# Patient Record
Sex: MALE | Race: WHITE | Employment: FULL TIME | URBAN - METROPOLITAN AREA
[De-identification: names, ages, dates, MRNs, and addresses within clinical notes are randomized per-mention and may not be internally consistent; named-entity substitution may affect disease eponyms.]

---

## 2021-06-03 ENCOUNTER — RECORDS - HEALTHEAST (OUTPATIENT)
Dept: LAB | Facility: CLINIC | Age: 26
End: 2021-06-03

## 2021-06-03 LAB
SARS-COV-2 PCR COMMENT: NORMAL
SARS-COV-2 RNA SPEC QL NAA+PROBE: NEGATIVE
SARS-COV-2 VIRUS SPECIMEN SOURCE: NORMAL

## 2021-12-23 ENCOUNTER — HOSPITAL ENCOUNTER (EMERGENCY)
Facility: CLINIC | Age: 26
Discharge: HOME | End: 2021-12-23
Attending: EMERGENCY MEDICINE | Admitting: EMERGENCY MEDICINE
Payer: COMMERCIAL

## 2021-12-23 VITALS
WEIGHT: 165 LBS | OXYGEN SATURATION: 96 % | DIASTOLIC BLOOD PRESSURE: 121 MMHG | BODY MASS INDEX: 25.9 KG/M2 | HEIGHT: 67 IN | HEART RATE: 132 BPM | TEMPERATURE: 97.5 F | SYSTOLIC BLOOD PRESSURE: 153 MMHG | RESPIRATION RATE: 16 BRPM

## 2021-12-23 DIAGNOSIS — F10.920 ALCOHOLIC INTOXICATION WITHOUT COMPLICATION (H): ICD-10-CM

## 2021-12-23 PROCEDURE — 99283 EMERGENCY DEPT VISIT LOW MDM: CPT

## 2021-12-23 ASSESSMENT — MIFFLIN-ST. JEOR: SCORE: 1692.07

## 2021-12-23 NOTE — ED PROVIDER NOTES
"  History   Chief Complaint:  Alcohol Intoxication (Patient was intoxicated on the airplane, he was removed and brought here.)       EDWARD Vu is a 26 year old male otherwise healthy who presents with alcohol intoxication. The patient was at the airport, on his way to [place], and drank too much. He can walk although he is a bit unsteady. He says he does have a problem with drinking. He denies any Covid symptoms. He rescheduled his flight for tomorrow.    Review of Systems   Psychiatric/Behavioral:        Alcohol intoxication   All other systems reviewed and are negative.    Allergies:  The patient has no known allergies.    Medications:  The patient is not currently taking any medications.    Past Medical History:     The patient denies past medical history including Covid.      Past Surgical History:    The patient denies past surgical history.    Family History:    The patient denies past family history.    Social History:  The patient presents alone. His parents live in [place]. His friend, Varun, is on his way to come and get him.    Physical Exam     Patient Vitals for the past 24 hrs:   BP Temp Temp src Pulse Resp SpO2 Height Weight   12/23/21 0831 -- 97.5  F (36.4  C) Temporal -- 16 -- 1.702 m (5' 7\") 74.8 kg (165 lb)   12/23/21 0830 (!) 153/121 -- -- -- -- 96 % -- --   12/23/21 0827 -- -- -- (!) 132 -- -- -- --       Physical Exam  Nursing note and vitals reviewed.  Constitutional:  Alert.  Ambulating on his own, mildly unsteady.  HENT:   No evidence of facial or scalp trauma.  Mucous membranes are moist.  Eyes:   Conjunctivae are normal.  Pupils are equal.     Right eye exhibits no discharge. Left eye exhibits no discharge.   Cardiovascular:  Normal rate, regular rhythm.      Normal heart sounds.     No murmur heard.  Pulmonary/Chest:  Breath sounds clear. No respiratory distress.     No stridor.   Neurological:   Alert but intoxicated.  Mildly slurred speech.  Ambulates under his own power, a " little unsteady.  Skin:    Skin is warm and dry. No rash noted. No diaphoresis.      No erythema. No pallor.   Psychiatric:   Behavior is normal. Judgment and thought content normal.  Intoxicated.    Emergency Department Course   Emergency Department Course:  Reviewed:  I reviewed nursing notes, vitals, past medical history and Care Everywhere    Assessments:  0820 I obtained history and examined the patient as noted above.     Disposition:  The patient was discharged to home with a sober .     Impression & Plan   Medical Decision Making:  Patient comes in from the airport because he was intoxicated.  The patient is ambulating on his own although he is drunk.  I talked with his friend Varun who said he would come and get him.  The patient apparently was rebooked for flight tomorrow.  I told him that he needs to get some help for his drinking and do not drink before he gets on the airplane.  Feel he is safe to be discharged.    Cut back on your drinking, do not drink before getting on an airplane.  Would recommend you set up an appointment when you get back from [place] with your doctor to talk about getting help.    Diagnosis:    ICD-10-CM    1. Alcoholic intoxication without complication (H)  F10.920      Scribe Disclosure:  MONTY, Hiral Conklin, am serving as a scribe at 8:29 AM on 12/23/2021 to document services personally performed by Belgica Yip MD based on my observations and the provider's statements to me.      Belgica Yip MD  12/23/21 2167

## 2021-12-23 NOTE — ED NOTES
Bed: [facility]  Expected date:   Expected time:   Means of arrival:   Comments:  Noah - 513 - 26 M ETOH eta 4081

## 2021-12-23 NOTE — ED NOTES
Varun arrived to the unit very upset and demanding to have his phone,  reported drinking before boarding . He was traveling to [place], [place] to see his family.     He became more calm while talking to me, and at this time he is crying because he does not get to flight today.  He is intoxicated.  My partner Roxana called his friend Varun and he will came to pick him up.  Patient will be released when friend comes to pick him up.

## 2021-12-23 NOTE — DISCHARGE INSTRUCTIONS
Cut back on your drinking, do not drink before getting on an airplane.  Would recommend you set up an appointment when you get back from [place] with your doctor to talk about getting help.

## 2022-03-20 ENCOUNTER — TRANSFERRED RECORDS (OUTPATIENT)
Dept: HEALTH INFORMATION MANAGEMENT | Facility: CLINIC | Age: 27
End: 2022-03-20
Payer: COMMERCIAL

## 2022-03-21 ENCOUNTER — HOSPITAL ENCOUNTER (EMERGENCY)
Facility: CLINIC | Age: 27
Discharge: NURSING, REHAB OR LONG-TERM CARE | End: 2022-03-21
Attending: FAMILY MEDICINE | Admitting: FAMILY MEDICINE
Payer: COMMERCIAL

## 2022-03-21 VITALS
OXYGEN SATURATION: 98 % | DIASTOLIC BLOOD PRESSURE: 90 MMHG | SYSTOLIC BLOOD PRESSURE: 138 MMHG | TEMPERATURE: 98.4 F | HEART RATE: 101 BPM | RESPIRATION RATE: 18 BRPM | BODY MASS INDEX: 26.37 KG/M2 | HEIGHT: 67 IN | WEIGHT: 168 LBS

## 2022-03-21 DIAGNOSIS — S05.01XA ABRASION OF RIGHT CORNEA, INITIAL ENCOUNTER: ICD-10-CM

## 2022-03-21 PROCEDURE — 99283 EMERGENCY DEPT VISIT LOW MDM: CPT | Performed by: FAMILY MEDICINE

## 2022-03-21 PROCEDURE — 99284 EMERGENCY DEPT VISIT MOD MDM: CPT | Performed by: FAMILY MEDICINE

## 2022-03-21 RX ORDER — NICOTINE POLACRILEX 4 MG/1
4 GUM, CHEWING ORAL
COMMUNITY
Start: 2022-03-21

## 2022-03-21 RX ORDER — CLONIDINE HYDROCHLORIDE 0.1 MG/1
1 TABLET ORAL 2 TIMES DAILY
COMMUNITY
Start: 2022-03-21

## 2022-03-21 RX ORDER — EPINEPHRINE 0.3 MG/.3ML
SOLUTION INTRAMUSCULAR; SUBCUTANEOUS
COMMUNITY
Start: 2022-03-21

## 2022-03-21 RX ORDER — FLUVOXAMINE MALEATE 100 MG
100 TABLET ORAL DAILY
COMMUNITY
Start: 2022-03-18

## 2022-03-21 RX ORDER — ACETAMINOPHEN 325 MG/1
650 TABLET ORAL EVERY 6 HOURS PRN
COMMUNITY

## 2022-03-21 RX ORDER — DEXTROAMPHETAMINE SACCHARATE, AMPHETAMINE ASPARTATE, DEXTROAMPHETAMINE SULFATE AND AMPHETAMINE SULFATE 2.5; 2.5; 2.5; 2.5 MG/1; MG/1; MG/1; MG/1
1 TABLET ORAL DAILY
COMMUNITY
Start: 2022-03-21

## 2022-03-21 RX ORDER — TETRACAINE HYDROCHLORIDE 5 MG/ML
1-2 SOLUTION OPHTHALMIC ONCE
Status: DISCONTINUED | OUTPATIENT
Start: 2022-03-21 | End: 2022-03-21 | Stop reason: HOSPADM

## 2022-03-21 RX ORDER — DEXTROAMPHETAMINE SULFATE, DEXTROAMPHETAMINE SACCHARATE, AMPHETAMINE SULFATE AND AMPHETAMINE ASPARTATE 5; 5; 5; 5 MG/1; MG/1; MG/1; MG/1
1 CAPSULE, EXTENDED RELEASE ORAL DAILY
COMMUNITY
Start: 2022-02-22

## 2022-03-21 RX ORDER — ERYTHROMYCIN 5 MG/G
0.5 OINTMENT OPHTHALMIC EVERY 4 HOURS
Qty: 3.5 G | Refills: 0 | Status: SHIPPED | OUTPATIENT
Start: 2022-03-21

## 2022-03-21 RX ORDER — IBUPROFEN 200 MG
600 TABLET ORAL EVERY 6 HOURS PRN
COMMUNITY

## 2022-03-21 RX ORDER — NICOTINE 21 MG/24HR
1 PATCH, TRANSDERMAL 24 HOURS TRANSDERMAL EVERY 24 HOURS
COMMUNITY
Start: 2022-03-21

## 2022-03-21 ASSESSMENT — VISUAL ACUITY
OS: 20/15
OD: NOT TESTED

## 2022-03-21 NOTE — ED TRIAGE NOTES
Pt reprots pain to right eye, pt reports started this morning, pt unsure of what got in his eye maybe an eyelash.

## 2022-03-21 NOTE — DISCHARGE INSTRUCTIONS
Apply ointment as directed for a day or 2 until tenderness subsides.    Take ibuprofen 600 mg every 6 hours for pain as needed.    Cold water washcloth applied as often as desired for comfort.    Be seen in eye clinic if symptoms worsen or do not resolve.

## 2022-03-21 NOTE — ED PROVIDER NOTES
"  History     Chief Complaint   Patient presents with     Eye Problem     Pt reprots pain to right eye, pt reports started this morning, pt unsure of what got in his eye maybe an eyelash.      HPI  Sriram Vu is a 26 year old male who presents with a 1 day history of irritation of her right eye.      Young man, age 26, just admitted to [facility] 1 day ago felt like he had something in his eye this morning.  He has tenderness and watering of the eye.  Not having blurred vision.  He does acknowledge that he rubbed the eye a bit.    Does not use glasses or wear contacts.    No known injury.    Sober from alcohol for 5 days.    Allergies:  Allergies   Allergen Reactions     Bee Venom Anaphylaxis       Problem List:    There are no problems to display for this patient.       Past Medical History:    No past medical history on file.    Past Surgical History:    No past surgical history on file.    Family History:    No family history on file.    Social History:  Marital Status:  Single [1]  Social History     Tobacco Use     Smoking status: Not on file     Smokeless tobacco: Not on file   Substance Use Topics     Alcohol use: Not on file     Drug use: Not on file        Medications:    erythromycin (ROMYCIN) 5 MG/GM ophthalmic ointment          Review of Systems    No fever.  No head congestion or sore throat.  No cough or short of breath.  No chest pain.    No abdominal pain.  No rash.        Physical Exam   BP: (!) 153/96  Pulse: 101  Temp: 98.4  F (36.9  C)  Resp: 18  Height: 170.2 cm (5' 7\")  Weight: 76.2 kg (168 lb)  SpO2: 98 %      Physical Exam  HENT:      Head: Normocephalic and atraumatic.   Eyes:      Pupils: Pupils are equal, round, and reactive to light.        Comments: Right eye shows minimal lid swelling and conjunctival injection.  After tetracaine ophthalmic drops for anesthesia, fluorescein was applied and there is an abrasion on the upper portion of the cornea as diagrammed.  Lids everted and no " foreign body seen.   Cardiovascular:      Rate and Rhythm: Regular rhythm.   Pulmonary:      Effort: Pulmonary effort is normal.   Abdominal:      Tenderness: There is no abdominal tenderness.   Musculoskeletal:      Cervical back: No tenderness.   Skin:     Findings: No rash.   Neurological:      Mental Status: He is alert.         ED Course                 Procedures              Critical Care time:  none               No results found for this or any previous visit (from the past 24 hour(s)).    Medications   tetracaine (PONTOCAINE) 0.5 % ophthalmic solution 1-2 drop (has no administration in time range)       Assessments & Plan (with Medical Decision Making)     My exam, patient has corneal abrasion.  I drew him a picture to show him the findings.  Ophthalmic ointment provided and advised to use ibuprofen and cold packing and observation.  He voices understanding of recommendations.    Follow-up advised in eye clinic for persistent or worsening symptoms.        I have reviewed the nursing notes.    I have reviewed the findings, diagnosis, plan and need for follow up with the patient.       New Prescriptions    ERYTHROMYCIN (ROMYCIN) 5 MG/GM OPHTHALMIC OINTMENT    Place 0.5 inches into the right eye every 4 hours       Final diagnoses:   Abrasion of right cornea, initial encounter       3/21/2022   [facility] EMERGENCY DEPT     Ermias Santana MD  03/21/22 [id number]

## 2022-04-03 ENCOUNTER — HEALTH MAINTENANCE LETTER (OUTPATIENT)
Age: 27
End: 2022-04-03

## 2022-10-01 ENCOUNTER — HEALTH MAINTENANCE LETTER (OUTPATIENT)
Age: 27
End: 2022-10-01

## 2023-05-14 ENCOUNTER — HEALTH MAINTENANCE LETTER (OUTPATIENT)
Age: 28
End: 2023-05-14

## 2024-05-18 ENCOUNTER — HEALTH MAINTENANCE LETTER (OUTPATIENT)
Age: 29
End: 2024-05-18